# Patient Record
Sex: MALE | Race: WHITE | NOT HISPANIC OR LATINO | URBAN - NONMETROPOLITAN AREA
[De-identification: names, ages, dates, MRNs, and addresses within clinical notes are randomized per-mention and may not be internally consistent; named-entity substitution may affect disease eponyms.]

---

## 2019-08-12 ENCOUNTER — IMPORTED ENCOUNTER (OUTPATIENT)
Dept: URBAN - NONMETROPOLITAN AREA CLINIC 1 | Facility: CLINIC | Age: 74
End: 2019-08-12

## 2019-08-12 PROBLEM — H52.4: Noted: 2019-08-12

## 2019-08-12 PROBLEM — H35.3131: Noted: 2019-08-12

## 2019-08-12 PROBLEM — H52.223: Noted: 2019-08-12

## 2019-08-12 PROBLEM — H52.13: Noted: 2019-08-12

## 2019-08-12 PROBLEM — H25.13: Noted: 2019-08-12

## 2019-08-12 PROBLEM — G35: Noted: 2019-08-12

## 2019-08-12 PROCEDURE — 92014 COMPRE OPH EXAM EST PT 1/>: CPT

## 2019-08-12 PROCEDURE — 92015 DETERMINE REFRACTIVE STATE: CPT

## 2019-08-12 PROCEDURE — 92134 CPTRZ OPH DX IMG PST SGM RTA: CPT

## 2019-08-12 NOTE — PATIENT DISCUSSION
Nuclear Sclerosis OU -  discussed findings w/patient-  no treatment indicated at this time-  UV protection recommended-  monitor yearly w/BAT or prn Mild ARMD OU -  discussed findings w/patient-  no known family history-  continue Macular Shield daily-  continue AG use daily patient to call or come in if changes in vision are noted from today -  OCT Mac done today: OD&OS: rare drusen -  monitor 1 year Complete w/OCT Mac Compound Myopic Astigmatism OU w/Presbyopia-  discussed findings w/patient-  new spectacle Rx issued-  monitor yearly or prn; Dr's Notes: Dr. Brittany Pascual  sees for [facility] in [place] [employer] in [facility])

## 2020-08-18 ENCOUNTER — IMPORTED ENCOUNTER (OUTPATIENT)
Dept: URBAN - NONMETROPOLITAN AREA CLINIC 1 | Facility: CLINIC | Age: 75
End: 2020-08-18

## 2020-08-18 PROBLEM — H52.223: Noted: 2019-08-12

## 2020-08-18 PROBLEM — H52.03: Noted: 2020-08-18

## 2020-08-18 PROBLEM — H52.4: Noted: 2019-08-12

## 2020-08-18 PROBLEM — H35.3131: Noted: 2019-08-12

## 2020-08-18 PROBLEM — H25.13: Noted: 2019-08-12

## 2020-08-18 PROCEDURE — 92134 CPTRZ OPH DX IMG PST SGM RTA: CPT

## 2020-08-18 PROCEDURE — 92015 DETERMINE REFRACTIVE STATE: CPT

## 2020-08-18 PROCEDURE — 92014 COMPRE OPH EXAM EST PT 1/>: CPT

## 2020-08-18 NOTE — PATIENT DISCUSSION
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 Clemencia Layton  sees for [facility] in [facility] in [facility] RETREAT 8/18/2020DFE 8/18/2020OCT Mac 8/18/2020

## 2022-04-09 ASSESSMENT — VISUAL ACUITY
OD_GLARE: 20/30
OS_GLARE: 20/25
OS_CC: 20/30
OU_SC: 20/20
OD_GLARE: 20/25
OS_CC: 20/25
OD_CC: 20/20-1
OS_GLARE: 20/30
OD_CC: 20/25

## 2022-04-09 ASSESSMENT — TONOMETRY
OS_IOP_MMHG: 12
OD_IOP_MMHG: 12

## 2023-10-20 ENCOUNTER — NEW PATIENT (OUTPATIENT)
Dept: URBAN - NONMETROPOLITAN AREA CLINIC 4 | Facility: CLINIC | Age: 78
End: 2023-10-20

## 2023-10-20 DIAGNOSIS — H35.363: ICD-10-CM

## 2023-10-20 DIAGNOSIS — H25.813: ICD-10-CM

## 2023-10-20 DIAGNOSIS — H16.223: ICD-10-CM

## 2023-10-20 PROCEDURE — 92004 COMPRE OPH EXAM NEW PT 1/>: CPT

## 2023-10-20 PROCEDURE — 92134 CPTRZ OPH DX IMG PST SGM RTA: CPT

## 2023-10-20 ASSESSMENT — VISUAL ACUITY
OS_SC: 20/30
OD_SC: 20/20-1

## 2023-10-20 ASSESSMENT — TONOMETRY
OS_IOP_MMHG: 14
OD_IOP_MMHG: 14

## 2024-10-21 ENCOUNTER — COMPREHENSIVE EXAM (OUTPATIENT)
Dept: URBAN - NONMETROPOLITAN AREA CLINIC 4 | Facility: CLINIC | Age: 79
End: 2024-10-21

## 2024-10-21 DIAGNOSIS — H25.813: ICD-10-CM

## 2024-10-21 DIAGNOSIS — H35.363: ICD-10-CM

## 2024-10-21 DIAGNOSIS — H52.223: ICD-10-CM

## 2024-10-21 DIAGNOSIS — H52.4: ICD-10-CM

## 2024-10-21 DIAGNOSIS — H16.223: ICD-10-CM

## 2024-10-21 DIAGNOSIS — H52.03: ICD-10-CM

## 2024-10-21 DIAGNOSIS — Z98.890: ICD-10-CM

## 2024-10-21 PROCEDURE — 92134 CPTRZ OPH DX IMG PST SGM RTA: CPT

## 2024-10-21 PROCEDURE — 92014 COMPRE OPH EXAM EST PT 1/>: CPT
